# Patient Record
Sex: FEMALE | URBAN - METROPOLITAN AREA
[De-identification: names, ages, dates, MRNs, and addresses within clinical notes are randomized per-mention and may not be internally consistent; named-entity substitution may affect disease eponyms.]

---

## 2022-10-14 ENCOUNTER — OFFICE VISIT (OUTPATIENT)
Dept: URBAN - METROPOLITAN AREA CLINIC 26 | Facility: CLINIC | Age: 47
End: 2022-10-14
Payer: COMMERCIAL

## 2022-10-14 DIAGNOSIS — D31.01 BENIGN NEOPLASM OF RIGHT CONJUNCTIVA: Primary | ICD-10-CM

## 2022-10-14 PROCEDURE — 99203 OFFICE O/P NEW LOW 30 MIN: CPT | Performed by: OPTOMETRIST

## 2022-10-14 ASSESSMENT — INTRAOCULAR PRESSURE
OD: 19
OS: 20

## 2022-10-14 NOTE — IMPRESSION/PLAN
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 Plan: suspicious elevated conjunctival lesion OD. pt denies discomfort/bleeding. pt was unaware of lesion until referred by her optometrist. will have pt see Dr. Terence Novak for consult, ~1month.

## 2022-10-19 ENCOUNTER — OFFICE VISIT (OUTPATIENT)
Dept: URBAN - METROPOLITAN AREA CLINIC 10 | Facility: CLINIC | Age: 47
End: 2022-10-19
Payer: COMMERCIAL

## 2022-10-19 DIAGNOSIS — D31.01 BENIGN NEOPLASM OF RIGHT CONJUNCTIVA: Primary | ICD-10-CM

## 2022-10-19 PROCEDURE — 92285 EXTERNAL OCULAR PHOTOGRAPHY: CPT | Performed by: OPHTHALMOLOGY

## 2022-10-19 PROCEDURE — 99204 OFFICE O/P NEW MOD 45 MIN: CPT | Performed by: OPHTHALMOLOGY

## 2022-10-19 ASSESSMENT — INTRAOCULAR PRESSURE
OD: 18
OS: 18

## 2022-10-19 NOTE — IMPRESSION/PLAN
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 Plan: Highly suspicious for papilloma vs OSSN. External photos taken. Condition discussed with patient. I recommend excisional biopsy of caruncular lesion with margins, cryotherapy. Have amniotic membrane transplantation (1.5x1.0mm) with fibrin glue. Will send lesion for histopathology examination.

## 2022-11-18 ENCOUNTER — ADULT PHYSICAL (OUTPATIENT)
Dept: URBAN - METROPOLITAN AREA CLINIC 24 | Facility: CLINIC | Age: 47
End: 2022-11-18
Payer: COMMERCIAL

## 2022-11-18 DIAGNOSIS — Z01.818 ENCOUNTER FOR OTHER PREPROCEDURAL EXAMINATION: Primary | ICD-10-CM

## 2022-11-18 DIAGNOSIS — D31.01 BENIGN NEOPLASM OF RIGHT CONJUNCTIVA: ICD-10-CM

## 2022-11-18 PROCEDURE — 99203 OFFICE O/P NEW LOW 30 MIN: CPT | Performed by: PHYSICIAN ASSISTANT

## 2022-12-05 ENCOUNTER — SURGERY (OUTPATIENT)
Dept: URBAN - METROPOLITAN AREA SURGERY 5 | Facility: SURGERY | Age: 47
End: 2022-12-05
Payer: COMMERCIAL

## 2022-12-05 DIAGNOSIS — D48.7 NEOPLASM OF UNCERTAIN BEHAVIOR OF OTHER SPECIFIED SITES: Primary | ICD-10-CM

## 2022-12-05 PROCEDURE — 68320 REVISE/GRAFT EYELID LINING: CPT | Performed by: OPHTHALMOLOGY

## 2022-12-06 ENCOUNTER — POST-OPERATIVE VISIT (OUTPATIENT)
Dept: URBAN - METROPOLITAN AREA CLINIC 24 | Facility: CLINIC | Age: 47
End: 2022-12-06
Payer: COMMERCIAL

## 2022-12-06 DIAGNOSIS — Z48.810 ENCOUNTER FOR SURGICAL AFTERCARE FOLLOWING SURGERY ON A SENSE ORGAN: Primary | ICD-10-CM

## 2022-12-06 PROCEDURE — 99024 POSTOP FOLLOW-UP VISIT: CPT | Performed by: OPTOMETRIST

## 2022-12-06 ASSESSMENT — INTRAOCULAR PRESSURE: OD: 19

## 2022-12-15 ENCOUNTER — OFFICE VISIT (OUTPATIENT)
Dept: URBAN - METROPOLITAN AREA CLINIC 24 | Facility: CLINIC | Age: 47
End: 2022-12-15
Payer: COMMERCIAL

## 2022-12-15 DIAGNOSIS — Z48.810 ENCOUNTER FOR SURGICAL AFTERCARE FOLLOWING SURGERY ON A SENSE ORGAN: Primary | ICD-10-CM

## 2022-12-15 PROCEDURE — 99024 POSTOP FOLLOW-UP VISIT: CPT | Performed by: OPHTHALMOLOGY

## 2022-12-15 ASSESSMENT — INTRAOCULAR PRESSURE
OS: 17
OD: 16

## 2022-12-15 NOTE — IMPRESSION/PLAN
Impression: Encounter for surgical aftercare following surgery on a sense organ  Z48.810. Plan: POW#1, Healing well, Decrease Pred to TID, taper weekly. d/c Ofloxacin. Cont lubrication. Precautions reviewed.   
Path pending, will call pt with results

## 2023-02-03 ENCOUNTER — OFFICE VISIT (OUTPATIENT)
Dept: URBAN - METROPOLITAN AREA CLINIC 24 | Facility: CLINIC | Age: 48
End: 2023-02-03
Payer: COMMERCIAL

## 2023-02-03 DIAGNOSIS — D31.01 BENIGN NEOPLASM OF RIGHT CONJUNCTIVA: ICD-10-CM

## 2023-02-03 DIAGNOSIS — H04.201 UNSPECIFIED EPIPHORA, RIGHT SIDE: Primary | ICD-10-CM

## 2023-02-03 DIAGNOSIS — H43.392 OTHER VITREOUS OPACITIES, LEFT EYE: ICD-10-CM

## 2023-02-03 PROCEDURE — 99024 POSTOP FOLLOW-UP VISIT: CPT | Performed by: OPHTHALMOLOGY

## 2023-02-03 ASSESSMENT — INTRAOCULAR PRESSURE
OS: 18
OD: 15

## 2023-02-03 NOTE — IMPRESSION/PLAN
Impression: Unspecified epiphora, right side: H04.201. Plan: H/o chronic sinusitis s/p sinus surgery x 2 and deviated septum s/p repair. Persistent epiphora OD, no dry eye dz noted today. Caruncular lesion no longer occluding punctum Will send to oculoplastics for eval.

## 2023-02-03 NOTE — IMPRESSION/PLAN
Impression: Other vitreous opacities, left eye: H43.392. Plan: Floaters accounts for the patient's complaints. There is no evidence of retinal pathology. All signs and risks of retinal detachment and tears were discussed in detail. Patient instructed to call the office immediately if any symptoms noted. Recommend the patient return to office for follow up.

## 2023-02-03 NOTE — IMPRESSION/PLAN
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
- s/p exc bx, cryo Plan: Path reveals benign conjunctival tissue with marked reactive hyperplasia, no overt lymphoid abnormality. 
Well healed

## 2023-02-06 ENCOUNTER — OFFICE VISIT (OUTPATIENT)
Dept: URBAN - METROPOLITAN AREA CLINIC 24 | Facility: CLINIC | Age: 48
End: 2023-02-06
Payer: COMMERCIAL

## 2023-02-06 DIAGNOSIS — H04.209 EPIPHORA: Primary | ICD-10-CM

## 2023-02-06 DIAGNOSIS — H04.551 ACQUIRED STENOSIS OF RIGHT NASOLACRIMAL DUCT: ICD-10-CM

## 2023-02-06 PROCEDURE — 99214 OFFICE O/P EST MOD 30 MIN: CPT | Performed by: STUDENT IN AN ORGANIZED HEALTH CARE EDUCATION/TRAINING PROGRAM

## 2023-02-06 PROCEDURE — 68801 DILATE TEAR DUCT OPENING: CPT | Performed by: STUDENT IN AN ORGANIZED HEALTH CARE EDUCATION/TRAINING PROGRAM

## 2023-02-06 NOTE — IMPRESSION/PLAN
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 Plan: Examination and irrigation of the lacrimal system revealed an obstruction of the pt's nasolacrimal duct. I recommend the pt undergo a dacryocystorhinostomy/ Anterior Ethmoidectomy with Lacrimal Sac Biopsy and placement of lacrimal stent to resolve the tearing problem, as well as prevent the likelihood of dacryocystitis in the future. We discussed some of the technical aspects of the procedure, as well as the r/b/a's.

## 2023-02-06 NOTE — IMPRESSION/PLAN
Impression: Epiphora: H04.209. Plan: Irrigated BLL puncta, Right puncta Reflux  100%, NL Outflow 0%, Left puncta Reflux 10%, NL Outflow 90%. See plan.

## 2023-04-11 ENCOUNTER — ADULT PHYSICAL (OUTPATIENT)
Dept: URBAN - METROPOLITAN AREA CLINIC 24 | Facility: CLINIC | Age: 48
End: 2023-04-11
Payer: COMMERCIAL

## 2023-04-11 DIAGNOSIS — H04.209 UNSPECIFIED EPIPHORA, UNSPECIFIED SIDE: ICD-10-CM

## 2023-04-11 DIAGNOSIS — Z01.818 ENCOUNTER FOR OTHER PREPROCEDURAL EXAMINATION: Primary | ICD-10-CM

## 2023-04-11 PROCEDURE — 99213 OFFICE O/P EST LOW 20 MIN: CPT | Performed by: PHYSICIAN ASSISTANT

## 2023-04-18 ENCOUNTER — POST-OPERATIVE VISIT (OUTPATIENT)
Dept: URBAN - METROPOLITAN AREA CLINIC 24 | Facility: CLINIC | Age: 48
End: 2023-04-18
Payer: COMMERCIAL

## 2023-04-18 DIAGNOSIS — Z48.89 ENCOUNTER FOR OTHER SPECIFIED SURGICAL AFTERCARE: Primary | ICD-10-CM

## 2023-04-18 PROCEDURE — 99024 POSTOP FOLLOW-UP VISIT: CPT | Performed by: STUDENT IN AN ORGANIZED HEALTH CARE EDUCATION/TRAINING PROGRAM

## 2023-04-18 NOTE — IMPRESSION/PLAN
Impression: S/P Excisional Biopsy of conjunctival lesion > 1cm; Destruction of conjunctival margons with cryotherapy; Conjunctivoplasty/conjunctival rearrangement OD - 134 Days. Encounter for other specified surgical aftercare  Z48.89. Plan: Healing well. Expected edema. No signs of infection. Sutures dissolvable. Discussed post op cares. Return in 3 months for stent removal. Call sooner prn.

## 2023-07-24 ENCOUNTER — POST-OPERATIVE VISIT (OUTPATIENT)
Dept: URBAN - METROPOLITAN AREA CLINIC 24 | Facility: CLINIC | Age: 48
End: 2023-07-24
Payer: COMMERCIAL

## 2023-07-24 DIAGNOSIS — Z48.89 ENCOUNTER FOR OTHER SPECIFIED SURGICAL AFTERCARE: Primary | ICD-10-CM

## 2023-07-24 PROCEDURE — 99024 POSTOP FOLLOW-UP VISIT: CPT | Performed by: STUDENT IN AN ORGANIZED HEALTH CARE EDUCATION/TRAINING PROGRAM

## 2023-08-21 ENCOUNTER — OFFICE VISIT (OUTPATIENT)
Dept: URBAN - METROPOLITAN AREA CLINIC 24 | Facility: CLINIC | Age: 48
End: 2023-08-21
Payer: COMMERCIAL

## 2023-08-21 DIAGNOSIS — H04.551 ACQUIRED STENOSIS OF RIGHT NASOLACRIMAL DUCT: Primary | ICD-10-CM

## 2023-08-21 PROCEDURE — 99024 POSTOP FOLLOW-UP VISIT: CPT | Performed by: STUDENT IN AN ORGANIZED HEALTH CARE EDUCATION/TRAINING PROGRAM
